# Patient Record
Sex: MALE | Race: WHITE
[De-identification: names, ages, dates, MRNs, and addresses within clinical notes are randomized per-mention and may not be internally consistent; named-entity substitution may affect disease eponyms.]

---

## 2017-08-28 ENCOUNTER — HOSPITAL ENCOUNTER (OUTPATIENT)
Dept: HOSPITAL 59 - HOP | Age: 67
Discharge: HOME | End: 2017-08-28
Attending: INTERNAL MEDICINE
Payer: MEDICARE

## 2017-08-28 DIAGNOSIS — E78.00: ICD-10-CM

## 2017-08-28 DIAGNOSIS — D12.0: ICD-10-CM

## 2017-08-28 DIAGNOSIS — D12.3: ICD-10-CM

## 2017-08-28 DIAGNOSIS — Z12.11: Primary | ICD-10-CM

## 2017-08-28 DIAGNOSIS — D12.4: ICD-10-CM

## 2017-08-28 DIAGNOSIS — I10: ICD-10-CM

## 2017-08-29 NOTE — OPERATIVE NOTE
DATE OF SURGERY: 08/28/2017



OPERATION: COLONOSCOPY to the cecum with cold snare polypectomy x4. 



INDICATION: Colorectal cancer screening. The patient's last examination was 10 years ago. 



ANESTHESIA: Intravenous sedation was administered by the department of anesthesiology and included 
Diprivan titrated to effect. 



PROCEDURE: Following informed consent from this alert individual including a discussion of the risks 
and benefits of the procedure and an opportunity for the patient to ask questions, the patient was 
in the left lateral decubitus position. A digital rectal examination was performed. No abnormalities 
were noted. Following this, the Olympus [id number] video colonoscope was inserted into the rectum 
without resistance. The rectal mucosa had a normal appearance with normal folds and distensibility. 
The colonoscope was advanced up through the colon to the level of the cecum without much difficulty. 
Throughout the bowel the mucosa appeared normal, the folds were normal, and the bowel was fairly 
well distensible. The cecum was defined by noting the appendiceal orifice and ileocecal valve. In 
the cecum at the level of the ileocecal valve was a 5 mm polyp which was removed with cold snare 
polypectomy. A clip was placed as well to prevent bleeding. There was a second polyp in the 
transverse colon likewise measuring 5 mm in size also removed with cold snare polypectomy and 
retrieved. There were 2 polyps in the descending colon measuring 4 and 5 mm in size and each was 
removed with cold snare polypectomy and suctioned through the endoscope into a collection trap. No 
other abnormalities were detected. The colon preparation was good. Retroflexion of the rectum was 
endoscopically unremarkable. The endoscope was removed. The patient tolerated the procedure well and 
was returned to the recovery area in stable condition. 



IMPRESSION: Four colon polyps removed as described above with cold snare polypectomy. 



RECOMMENDATIONS: The patient was advised he should receive a copy of his pathology report at home in 
the next 2-3 weeks. If not, he was asked to call my office to review the results of testing today. 
Further recommendations may be forthcoming pending those results. Followup will also be with Dr. Dwayne Nam. 



As always, thank you for allowing me to participate in the care of your patient.  CC: DENG MARQUEZ

## 2018-11-08 ENCOUNTER — HOSPITAL ENCOUNTER (OUTPATIENT)
Dept: HOSPITAL 59 - SUR | Age: 68
Discharge: HOME | End: 2018-11-08
Attending: ORTHOPAEDIC SURGERY
Payer: MEDICARE

## 2018-11-08 DIAGNOSIS — E78.00: ICD-10-CM

## 2018-11-08 DIAGNOSIS — I10: ICD-10-CM

## 2018-11-08 DIAGNOSIS — G56.02: Primary | ICD-10-CM

## 2018-11-08 PROCEDURE — 93005 ELECTROCARDIOGRAM TRACING: CPT

## 2018-11-08 PROCEDURE — 64721 CARPAL TUNNEL SURGERY: CPT

## 2018-11-08 PROCEDURE — 01810 ANES PX NRV MUSC F/ARM WRST: CPT

## 2018-11-09 NOTE — OPERATIVE NOTE
DATE OF SURGERY: 11/08/2018



Surgeon: Yehuda Hair DO



PREOPERATIVE DIAGNOSIS: Carpal tunnel syndrome of the left wrist. 



POSTOPERATIVE DIAGNOSIS: Carpal tunnel syndrome of the left wrist. 



OPERATION: Decompression left median nerve at the wrist using 3.5 loop magnification. 



DESCRIPTION OF PROCEDURE: This 68-year-old male was taken to the operating room and placed in the 
supine position on the operating room table. General anesthesia was induced. The left upper 
extremity was elevated. It was prepped with Hibiclens and draped in the usual sterile fashion. It 
was exsanguinated and the tourniquet inflated to 250 mmHg. 



A palmar incision was utilized following the hypothenar crease and dissection carried down through 
the skin and subcutaneous tissue. Palmar fascia was divided in line with the skin incision. This 
exposed the flexor retinaculum which was punctured and split to its proximal margin. Then with the 
contents of carpal tunnel under direct vision, the transverse carpal ligament was transected along 
its ulnar border and the radial flap was raised to expose the entire median nerve under the 
transverse carpal ligament. The recurrent motor branch of the median nerve was identified and found 
to be normal. There was hyperemia of the nerve but it was otherwise normal to the naked eye. The 
wound was irrigated with lactated Ringer's solution, and the tourniquet was released. Hemostasis 
obtained with the electrocautery. The wound was closed with interrupted 6-0 nylon suture. Sterile 
dressings were applied with plastic splint immobilization with the wrist in slight dorsiflexion and 
the thumb in an adducted position. 



GROSS PATHOLOGY: This patient demonstrated mild hyperemia of the medial nerve but was otherwise 
normal to the naked eye. CC: DENG MARQUEZ

## 2019-03-21 ENCOUNTER — HOSPITAL ENCOUNTER (EMERGENCY)
Dept: HOSPITAL 59 - ER | Age: 69
Discharge: HOME | End: 2019-03-21
Payer: MEDICARE

## 2019-03-21 DIAGNOSIS — I10: ICD-10-CM

## 2019-03-21 DIAGNOSIS — Z85.528: ICD-10-CM

## 2019-03-21 DIAGNOSIS — Z87.891: ICD-10-CM

## 2019-03-21 DIAGNOSIS — K92.1: Primary | ICD-10-CM

## 2019-03-21 LAB
ABO GROUP: (no result)
ALBUMIN SERPL-MCNC: 4.2 G/DL (ref 4–5)
ALBUMIN/GLOB SERPL: 1.6 {RATIO} (ref 1.1–1.8)
ALP SERPL-CCNC: 72 U/L (ref 40–129)
ALT SERPL-CCNC: 33 U/L (ref ?–41)
ANION GAP SERPL CALC-SCNC: 13 MMOL/L (ref 7–16)
ANTIBODY SCREEN: NEGATIVE
APTT BLD: 31.3 SECONDS (ref 24.5–39.1)
AST SERPL-CCNC: 24 U/L (ref 10–50)
BASOPHILS NFR BLD: 0.4 % (ref 0–6)
BILIRUB SERPL-MCNC: 0.6 MG/DL (ref 0.2–1)
BUN SERPL-MCNC: 14 MG/DL (ref 8–23)
CO2 SERPL-SCNC: 23 MMOL/L (ref 22–29)
CREAT SERPL-MCNC: 1.1 MG/DL (ref 0.7–1.2)
EOSINOPHIL NFR BLD: 2.6 % (ref 0–6)
ERYTHROCYTE [DISTWIDTH] IN BLOOD BY AUTOMATED COUNT: 14.1 % (ref 11.5–14.5)
EST GLOMERULAR FILTRATION RATE: > 60 ML/MIN
GLOBULIN SER-MCNC: 2.7 GM/DL (ref 1.4–4.8)
GLUCOSE SERPL-MCNC: 102 MG/DL (ref 74–109)
GRANULOCYTES NFR BLD: 50.5 % (ref 47–80)
HCT VFR BLD CALC: 47.5 % (ref 42–52)
HGB BLD-MCNC: 16.2 GM/DL (ref 14–18)
INR PPP: 1.1
LYMPHOCYTES NFR BLD AUTO: 34.6 % (ref 16–45)
MCH RBC QN AUTO: 31.2 PG (ref 27–33)
MCHC RBC AUTO-ENTMCNC: 34.1 G/DL (ref 32–36)
MCV RBC AUTO: 91.3 FL (ref 81–97)
MONOCYTES NFR BLD: 11.9 % (ref 0–9)
PLATELET # BLD: 175 K/UL (ref 130–400)
PMV BLD AUTO: 10.5 FL (ref 7.4–10.4)
PROT SERPL-MCNC: 6.9 G/DL (ref 6.6–8.7)
PROTHROMBIN TIME: 10.6 SECONDS (ref 9.5–12.1)
RBC # BLD AUTO: 5.2 M/UL (ref 4.4–5.7)
RH TYPE: NEGATIVE
WBC # BLD AUTO: 5.5 K/UL (ref 4.2–12.2)

## 2019-03-21 PROCEDURE — 85610 PROTHROMBIN TIME: CPT

## 2019-03-21 PROCEDURE — 80053 COMPREHEN METABOLIC PANEL: CPT

## 2019-03-21 PROCEDURE — 86900 BLOOD TYPING SEROLOGIC ABO: CPT

## 2019-03-21 PROCEDURE — 85730 THROMBOPLASTIN TIME PARTIAL: CPT

## 2019-03-21 PROCEDURE — 99283 EMERGENCY DEPT VISIT LOW MDM: CPT

## 2019-03-21 PROCEDURE — 85025 COMPLETE CBC W/AUTO DIFF WBC: CPT

## 2019-03-21 PROCEDURE — 86901 BLOOD TYPING SEROLOGIC RH(D): CPT

## 2019-03-21 PROCEDURE — 86850 RBC ANTIBODY SCREEN: CPT

## 2019-03-21 PROCEDURE — 99284 EMERGENCY DEPT VISIT MOD MDM: CPT

## 2019-03-27 ENCOUNTER — HOSPITAL ENCOUNTER (OUTPATIENT)
Dept: HOSPITAL 59 - SUR | Age: 69
Discharge: HOME | End: 2019-03-27
Attending: PAIN MEDICINE
Payer: MEDICARE

## 2019-03-27 DIAGNOSIS — M54.17: ICD-10-CM

## 2019-03-27 DIAGNOSIS — Z86.14: ICD-10-CM

## 2019-03-27 DIAGNOSIS — G62.9: ICD-10-CM

## 2019-03-27 DIAGNOSIS — M54.16: Primary | ICD-10-CM

## 2019-03-27 DIAGNOSIS — E78.00: ICD-10-CM

## 2019-03-27 DIAGNOSIS — I10: ICD-10-CM

## 2019-03-27 DIAGNOSIS — K21.9: ICD-10-CM

## 2019-03-27 DIAGNOSIS — Z85.850: ICD-10-CM

## 2019-03-27 DIAGNOSIS — R60.9: ICD-10-CM

## 2019-03-27 DIAGNOSIS — G25.81: ICD-10-CM

## 2019-03-27 DIAGNOSIS — K62.5: ICD-10-CM

## 2019-03-27 DIAGNOSIS — Z85.528: ICD-10-CM

## 2019-03-27 PROCEDURE — 63685 INS/RPLC SPI NPG/RCVR POCKET: CPT

## 2019-03-27 PROCEDURE — 00300 ANES ALL PX INTEG H/N/PTRUNK: CPT

## 2019-03-27 NOTE — HISTORY AND PHYSICAL - FERRO
CHIEF COMPLAINT/HISTORY OF CHIEF COMPLAINT:  This patient presents with a 
history of an intractable lumbar radiculopathy.  Due to the failure of therapy 
and the successful spinal cord stimulator trial, permanent implant performed on 
10/19/16 although over the years the system has worked quite well there has 
been a pattern of change of his pain moving somewhat laterally and inferiorly 
more than previous.  Multiple attempts at reprogramming the system to control 
this new area of pain was somewhat limited.  With the availability of the new 
generator which provides more programmability and  greater functionality, it 
was felt appropriate to change the generator of the current system to help keep 
up with the changing pattern of his pain.  He is here for a generator change 
without lead revision.  



PAST MEDICAL HISTORY:  Hypothyroidism and hypertension.



PAST SURGICAL HISTORY:  Thyroid surgery.  



MEDICATIONS ON ADMISSION:  List to be provided.  



ALLERGIES:  None.  



FAMILY/PSYCHOSOCIAL HISTORY:  Social history - Noncontributory.  Family history 
- Cancer.  



SYSTEMS REVIEW:  The patient is appropriate in no acute distress.  The 
remainder of the systems review is positive for thyroid cancer and blood 
pressure problems.  



PHYSICAL EXAMINATION:  Height is 5'11", weight is 300 pounds.  No vital signs.  

HEENT:  Within normal limits.  

LUNGS:  Clear.

HEART:  Rapid and regular.  

ABDOMEN:  Nontender.  

MUSCULOSKELETAL:  Examination of the musculoskeletal system shows the midline 
incision for the leads intact.  The generator pouch at the left posterior 
gluteal margin is noted and intact.  Primary pain pattern is low back with a 
bilateral lower extremity extension.  There is pain extensively along multiple 
dermatomes.  No appreciable motor or sensory weakness.  Ambulation - No 
assistive device utilized.  

NEUROLOGIC:  Cranial nerves are intact.  



IMPRESSION:  

1.  LUMBAR RADICULOPATHY, ICD-10 CODE M54.16 AND M54.17.

2.  SPINAL CORD STIMULATOR  AND INTERNAL GENERATOR.  



PLAN:  The patient is here for removal and replacement of the generator on an 
outpatient basis.  The risks, side effects and complications have been reviewed 
and discussed.  Information was provided by the .  Direct contact 
with clinical specialist was provided.  The procedure will be considered 
outpatient, an overnight stay will not be necessary.  



JOB NUMBER:  [id number]
MTDD

## 2019-03-29 NOTE — OPERATIVE NOTE
DATE:  03/27/2019.



PRIMARY CARE PHYSICIAN:  Dwayne Nam D.O.



PREOPERATIVE DIAGNOSIS:

1.  INTRACTABLE LUMBAR RADICULOPATHY, ICD-10 CODE M54.16 AND M54.17.

2.  SPINAL CORD STIMULATOR INTERNAL GENERATOR NONFUNCTIONAL.



POSTOPERATIVE DIAGNOSIS:

1.  INTRACTABLE LUMBAR RADICULOPATHY, ICD-10 CODE M54.16 AND M54.17.

2.  SPINAL CORD STIMULATOR INTERNAL GENERATOR NONFUNCTIONAL.



PROCEDURE:

Fluoroscopically guided incision, subcutaneous dissection, and removal and 
replacement of internal pulse generator at left posterior gluteal margin with 
[employer] WaveWriter generator.



SURGEON:  Artur Jacob D.O.



ANESTHESIA:   Local sedation.



ANESTHESIA PROVIDER:  Sybil Hernandez CRNA.



INDICATIONS:  This patient presents with a history of intractable lumbar 
radiculopathy.  A spinal cord stimulator internal generator had been placed 
previously which appeared to be doing quite well until his pain started to move 
and change.  As his pain migrated more laterally and up higher into his spine, 
the current system, which was placed for a lower extremity component, was 
unable to keep up with the pain.  Despite multiple reprogramming efforts, he 
continued to have breakthrough pain in these new areas.  It was decided that 
the new WaveWriter generator released by [employer] with multiple 
programming options which were previously not available, would provide us with 
a mechanism to control his pain.                                               
                                                                               
                                                                               
                                                                               
                                                                               
                                                   



DESCRIPTION OF PROCEDURE:  Intravenous line, vital sign monitoring, and 
intravenous sedation.  Prepped and draped with sterile technique with the 
patient positioned prone.  Sterile prep at the left posterior gluteal margin 
generator pouch.  The skin was infiltrated and an incision was made.  
Subcutaneous dissection was conducted to the generator pouch.  The pouch was 
opened, and the generator was exteriorized.  It was then  from the 
indwelling leads.  The new generator was placed onto the field and interfaced 
with the existing leads.  Antibiotic irrigation and Bovie for hemostasis.  The 
system was checked for impedance readings and functionality.  The incision was 
then closed using Stratafix suture; #2-0 for the fascia and #3-0 for the skin.  
Dermabond closure.



He was transported to the recovery room stable with no side effects from the 
procedure or the sedation.  When fully awake and alert complex programming of 
the system was performed over 20 minutes, re-establishing stimulation.  He was 
instructed in the use of the system and was provided information regarding 
error messaging.  He was then prepared for discharge.



DISCHARGE INSTRUCTIONS:

1.  The sites are to remain clean and dry.  He may shower with the Dermabond 
but he may not sit in water or tubs.

2.  Standard medications to be resumed including the antibiotic Levaquin 500 mg 
once a day for 14 days.

3.  The office will contact the patient in 12 to 24 hours to set up an 
appointment within the next seven to ten days to evaluate the sites.  Until 
then activities should stay controlled; limit bend, lift, push, and pull.  

4.  All other instructions were provided including numbers to contact with 
problems.  He was then discharged.  



JOB NUMBER:  [id number]



cc:  DENG Mendosa

## 2022-02-10 ENCOUNTER — EMERGENCY VISIT (OUTPATIENT)
Dept: URBAN - METROPOLITAN AREA CLINIC 36 | Facility: CLINIC | Age: 72
End: 2022-02-10

## 2022-02-10 DIAGNOSIS — H15.102: ICD-10-CM

## 2022-02-10 DIAGNOSIS — H57.12: ICD-10-CM

## 2022-02-10 PROCEDURE — 92002 INTRM OPH EXAM NEW PATIENT: CPT

## 2022-02-10 ASSESSMENT — VISUAL ACUITY
OS_SC: 20/40+2
OD_SC: 20/20-2
OS_PH: 20/25

## 2022-02-10 ASSESSMENT — TONOMETRY
OS_IOP_MMHG: 22
OD_IOP_MMHG: 20
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
